# Patient Record
Sex: FEMALE | Race: WHITE | Employment: FULL TIME | URBAN - METROPOLITAN AREA
[De-identification: names, ages, dates, MRNs, and addresses within clinical notes are randomized per-mention and may not be internally consistent; named-entity substitution may affect disease eponyms.]

---

## 2017-07-10 ENCOUNTER — OFFICE VISIT (OUTPATIENT)
Dept: ORTHOPEDIC SURGERY | Age: 37
End: 2017-07-10

## 2017-07-10 VITALS
SYSTOLIC BLOOD PRESSURE: 109 MMHG | DIASTOLIC BLOOD PRESSURE: 74 MMHG | HEIGHT: 62 IN | HEART RATE: 76 BPM | BODY MASS INDEX: 28.52 KG/M2 | WEIGHT: 155 LBS

## 2017-07-10 DIAGNOSIS — M21.41 PES PLANUS OF BOTH FEET: ICD-10-CM

## 2017-07-10 DIAGNOSIS — M17.11 PRIMARY OSTEOARTHRITIS OF RIGHT KNEE: ICD-10-CM

## 2017-07-10 DIAGNOSIS — M23.303 MENISCUS, MEDIAL, DERANGEMENT, RIGHT: ICD-10-CM

## 2017-07-10 DIAGNOSIS — S83.411D SPRAIN OF MEDIAL COLLATERAL LIGAMENT OF RIGHT KNEE, SUBSEQUENT ENCOUNTER: ICD-10-CM

## 2017-07-10 DIAGNOSIS — M21.861 INTERNAL TIBIAL TORSION OF RIGHT LOWER EXTREMITY: ICD-10-CM

## 2017-07-10 DIAGNOSIS — T84.89XD ACL GRAFT TEAR, SUBSEQUENT ENCOUNTER: ICD-10-CM

## 2017-07-10 DIAGNOSIS — M25.461 EFFUSION OF RIGHT KNEE: ICD-10-CM

## 2017-07-10 DIAGNOSIS — Z98.890 S/P ACL REPAIR: Primary | ICD-10-CM

## 2017-07-10 DIAGNOSIS — M21.42 PES PLANUS OF BOTH FEET: ICD-10-CM

## 2017-07-10 PROCEDURE — 73564 X-RAY EXAM KNEE 4 OR MORE: CPT | Performed by: ORTHOPAEDIC SURGERY

## 2017-07-10 PROCEDURE — 99214 OFFICE O/P EST MOD 30 MIN: CPT | Performed by: ORTHOPAEDIC SURGERY

## 2017-07-10 RX ORDER — LIOTHYRONINE SODIUM 5 UG/1
TABLET ORAL
COMMUNITY
Start: 2017-04-05

## 2017-07-10 RX ORDER — RIZATRIPTAN BENZOATE 10 MG/1
10 TABLET ORAL
COMMUNITY

## 2017-07-17 ENCOUNTER — HOSPITAL ENCOUNTER (OUTPATIENT)
Dept: PHYSICAL THERAPY | Age: 37
Discharge: HOME | End: 2017-07-31
Admitting: ORTHOPAEDIC SURGERY

## 2017-07-31 ENCOUNTER — HOSPITAL ENCOUNTER (OUTPATIENT)
Dept: PHYSICAL THERAPY | Age: 37
Discharge: HOME | End: 2017-07-31
Admitting: ORTHOPAEDIC SURGERY

## 2018-10-04 ENCOUNTER — HOSPITAL ENCOUNTER (EMERGENCY)
Age: 38
Discharge: HOME | End: 2018-10-04
Attending: EMERGENCY MEDICINE
Payer: COMMERCIAL

## 2018-10-04 VITALS
DIASTOLIC BLOOD PRESSURE: 76 MMHG | RESPIRATION RATE: 16 BRPM | WEIGHT: 129.85 LBS | HEART RATE: 83 BPM | BODY MASS INDEX: 23.9 KG/M2 | OXYGEN SATURATION: 97 % | SYSTOLIC BLOOD PRESSURE: 125 MMHG | HEIGHT: 62 IN | TEMPERATURE: 98.3 F

## 2018-10-04 DIAGNOSIS — R51.9 FRONTAL HEADACHE: Primary | ICD-10-CM

## 2018-10-04 PROCEDURE — 2580000003 HC RX 258: Performed by: EMERGENCY MEDICINE

## 2018-10-04 PROCEDURE — 96361 HYDRATE IV INFUSION ADD-ON: CPT

## 2018-10-04 PROCEDURE — 6360000002 HC RX W HCPCS: Performed by: EMERGENCY MEDICINE

## 2018-10-04 PROCEDURE — 96375 TX/PRO/DX INJ NEW DRUG ADDON: CPT

## 2018-10-04 PROCEDURE — 96374 THER/PROPH/DIAG INJ IV PUSH: CPT

## 2018-10-04 PROCEDURE — 99283 EMERGENCY DEPT VISIT LOW MDM: CPT

## 2018-10-04 RX ORDER — 0.9 % SODIUM CHLORIDE 0.9 %
1000 INTRAVENOUS SOLUTION INTRAVENOUS ONCE
Status: COMPLETED | OUTPATIENT
Start: 2018-10-04 | End: 2018-10-04

## 2018-10-04 RX ORDER — KETOROLAC TROMETHAMINE 30 MG/ML
15 INJECTION, SOLUTION INTRAMUSCULAR; INTRAVENOUS ONCE
Status: COMPLETED | OUTPATIENT
Start: 2018-10-04 | End: 2018-10-04

## 2018-10-04 RX ORDER — DIPHENHYDRAMINE HYDROCHLORIDE 50 MG/ML
25 INJECTION INTRAMUSCULAR; INTRAVENOUS ONCE
Status: COMPLETED | OUTPATIENT
Start: 2018-10-04 | End: 2018-10-04

## 2018-10-04 RX ORDER — DEXAMETHASONE SODIUM PHOSPHATE 4 MG/ML
10 INJECTION, SOLUTION INTRA-ARTICULAR; INTRALESIONAL; INTRAMUSCULAR; INTRAVENOUS; SOFT TISSUE ONCE
Status: COMPLETED | OUTPATIENT
Start: 2018-10-04 | End: 2018-10-04

## 2018-10-04 RX ADMIN — PROCHLORPERAZINE EDISYLATE 10 MG: 5 INJECTION INTRAMUSCULAR; INTRAVENOUS at 17:21

## 2018-10-04 RX ADMIN — DIPHENHYDRAMINE HYDROCHLORIDE 25 MG: 50 INJECTION, SOLUTION INTRAMUSCULAR; INTRAVENOUS at 18:25

## 2018-10-04 RX ADMIN — SODIUM CHLORIDE 1000 ML: 9 INJECTION, SOLUTION INTRAVENOUS at 17:16

## 2018-10-04 RX ADMIN — DEXAMETHASONE SODIUM PHOSPHATE 10 MG: 4 INJECTION, SOLUTION INTRAMUSCULAR; INTRAVENOUS at 17:17

## 2018-10-04 RX ADMIN — KETOROLAC TROMETHAMINE 15 MG: 30 INJECTION, SOLUTION INTRAMUSCULAR at 17:19

## 2018-10-04 ASSESSMENT — PAIN DESCRIPTION - FREQUENCY: FREQUENCY: CONTINUOUS

## 2018-10-04 ASSESSMENT — PAIN DESCRIPTION - PAIN TYPE: TYPE: ACUTE PAIN

## 2018-10-04 ASSESSMENT — ENCOUNTER SYMPTOMS
VOMITING: 0
SHORTNESS OF BREATH: 0
TROUBLE SWALLOWING: 0
COLOR CHANGE: 0
PHOTOPHOBIA: 1
ABDOMINAL PAIN: 0
WHEEZING: 0
FACIAL SWELLING: 0
NAUSEA: 1
VOICE CHANGE: 0
STRIDOR: 0

## 2018-10-04 ASSESSMENT — PAIN SCALES - GENERAL
PAINLEVEL_OUTOF10: 1
PAINLEVEL_OUTOF10: 10
PAINLEVEL_OUTOF10: 10
PAINLEVEL_OUTOF10: 1

## 2018-10-04 ASSESSMENT — PAIN DESCRIPTION - ORIENTATION: ORIENTATION: LEFT

## 2018-10-04 ASSESSMENT — PAIN DESCRIPTION - DESCRIPTORS: DESCRIPTORS: ACHING;CONSTANT

## 2018-10-04 ASSESSMENT — PAIN DESCRIPTION - LOCATION: LOCATION: HEAD

## 2018-10-10 ENCOUNTER — APPOINTMENT (OUTPATIENT)
Dept: GENERAL RADIOLOGY | Age: 38
End: 2018-10-10
Payer: COMMERCIAL

## 2018-10-10 ENCOUNTER — HOSPITAL ENCOUNTER (EMERGENCY)
Age: 38
Discharge: HOME | End: 2018-10-10
Attending: EMERGENCY MEDICINE
Payer: COMMERCIAL

## 2018-10-10 VITALS
OXYGEN SATURATION: 98 % | SYSTOLIC BLOOD PRESSURE: 120 MMHG | HEART RATE: 80 BPM | TEMPERATURE: 98 F | BODY MASS INDEX: 23.85 KG/M2 | WEIGHT: 129.63 LBS | DIASTOLIC BLOOD PRESSURE: 72 MMHG | RESPIRATION RATE: 18 BRPM | HEIGHT: 62 IN

## 2018-10-10 DIAGNOSIS — N20.1 URETEROLITHIASIS: Primary | ICD-10-CM

## 2018-10-10 LAB
ANION GAP SERPL CALCULATED.3IONS-SCNC: 15 MMOL/L (ref 3–16)
BACTERIA: ABNORMAL /HPF
BASOPHILS ABSOLUTE: 0 K/UL (ref 0–0.2)
BASOPHILS RELATIVE PERCENT: 0.2 %
BILIRUBIN URINE: ABNORMAL
BLOOD, URINE: ABNORMAL
BUN BLDV-MCNC: 12 MG/DL (ref 7–20)
CALCIUM SERPL-MCNC: 9.2 MG/DL (ref 8.3–10.6)
CHLORIDE BLD-SCNC: 99 MMOL/L (ref 99–110)
CLARITY: ABNORMAL
CO2: 25 MMOL/L (ref 21–32)
COLOR: YELLOW
CREAT SERPL-MCNC: 1.2 MG/DL (ref 0.6–1.1)
EOSINOPHILS ABSOLUTE: 0.1 K/UL (ref 0–0.6)
EOSINOPHILS RELATIVE PERCENT: 1.1 %
EPITHELIAL CELLS, UA: 4 /HPF (ref 0–5)
GFR AFRICAN AMERICAN: >60
GFR NON-AFRICAN AMERICAN: 50
GLUCOSE BLD-MCNC: 92 MG/DL (ref 70–99)
GLUCOSE URINE: NEGATIVE MG/DL
HCT VFR BLD CALC: 39.5 % (ref 36–48)
HEMOGLOBIN: 13.7 G/DL (ref 12–16)
HYALINE CASTS: 6 /LPF (ref 0–8)
KETONES, URINE: 40 MG/DL
LEUKOCYTE ESTERASE, URINE: NEGATIVE
LYMPHOCYTES ABSOLUTE: 0.8 K/UL (ref 1–5.1)
LYMPHOCYTES RELATIVE PERCENT: 8.7 %
MCH RBC QN AUTO: 30.4 PG (ref 26–34)
MCHC RBC AUTO-ENTMCNC: 34.6 G/DL (ref 31–36)
MCV RBC AUTO: 88 FL (ref 80–100)
MICROSCOPIC EXAMINATION: YES
MONOCYTES ABSOLUTE: 0.6 K/UL (ref 0–1.3)
MONOCYTES RELATIVE PERCENT: 6.1 %
NEUTROPHILS ABSOLUTE: 7.5 K/UL (ref 1.7–7.7)
NEUTROPHILS RELATIVE PERCENT: 83.9 %
NITRITE, URINE: NEGATIVE
PDW BLD-RTO: 12.7 % (ref 12.4–15.4)
PH UA: 5.5
PLATELET # BLD: 214 K/UL (ref 135–450)
PMV BLD AUTO: 9.3 FL (ref 5–10.5)
POTASSIUM SERPL-SCNC: 3.9 MMOL/L (ref 3.5–5.1)
PROTEIN UA: ABNORMAL MG/DL
RBC # BLD: 4.49 M/UL (ref 4–5.2)
RBC UA: 32 /HPF (ref 0–4)
SODIUM BLD-SCNC: 139 MMOL/L (ref 136–145)
SPECIFIC GRAVITY UA: 1.02
URINE REFLEX TO CULTURE: YES
URINE TYPE: ABNORMAL
UROBILINOGEN, URINE: 0.2 E.U./DL
WBC # BLD: 9 K/UL (ref 4–11)
WBC UA: 8 /HPF (ref 0–5)

## 2018-10-10 PROCEDURE — 80048 BASIC METABOLIC PNL TOTAL CA: CPT

## 2018-10-10 PROCEDURE — 6360000002 HC RX W HCPCS: Performed by: EMERGENCY MEDICINE

## 2018-10-10 PROCEDURE — 85025 COMPLETE CBC W/AUTO DIFF WBC: CPT

## 2018-10-10 PROCEDURE — 99284 EMERGENCY DEPT VISIT MOD MDM: CPT

## 2018-10-10 PROCEDURE — 87086 URINE CULTURE/COLONY COUNT: CPT

## 2018-10-10 PROCEDURE — 74018 RADEX ABDOMEN 1 VIEW: CPT

## 2018-10-10 PROCEDURE — 96374 THER/PROPH/DIAG INJ IV PUSH: CPT

## 2018-10-10 PROCEDURE — 81001 URINALYSIS AUTO W/SCOPE: CPT

## 2018-10-10 PROCEDURE — 96375 TX/PRO/DX INJ NEW DRUG ADDON: CPT

## 2018-10-10 RX ORDER — ONDANSETRON 2 MG/ML
4 INJECTION INTRAMUSCULAR; INTRAVENOUS ONCE
Status: COMPLETED | OUTPATIENT
Start: 2018-10-10 | End: 2018-10-10

## 2018-10-10 RX ORDER — MORPHINE SULFATE 4 MG/ML
4 INJECTION, SOLUTION INTRAMUSCULAR; INTRAVENOUS ONCE
Status: COMPLETED | OUTPATIENT
Start: 2018-10-10 | End: 2018-10-10

## 2018-10-10 RX ORDER — KETOROLAC TROMETHAMINE 30 MG/ML
30 INJECTION, SOLUTION INTRAMUSCULAR; INTRAVENOUS ONCE
Status: COMPLETED | OUTPATIENT
Start: 2018-10-10 | End: 2018-10-10

## 2018-10-10 RX ADMIN — MORPHINE SULFATE 4 MG: 4 INJECTION INTRAVENOUS at 08:27

## 2018-10-10 RX ADMIN — ONDANSETRON 4 MG: 2 INJECTION INTRAMUSCULAR; INTRAVENOUS at 08:26

## 2018-10-10 RX ADMIN — KETOROLAC TROMETHAMINE 30 MG: 30 INJECTION, SOLUTION INTRAMUSCULAR at 08:26

## 2018-10-10 ASSESSMENT — PAIN DESCRIPTION - FREQUENCY: FREQUENCY: CONTINUOUS

## 2018-10-10 ASSESSMENT — PAIN SCALES - GENERAL
PAINLEVEL_OUTOF10: 3
PAINLEVEL_OUTOF10: 9
PAINLEVEL_OUTOF10: 9
PAINLEVEL_OUTOF10: 3
PAINLEVEL_OUTOF10: 9

## 2018-10-10 ASSESSMENT — PAIN - FUNCTIONAL ASSESSMENT: PAIN_FUNCTIONAL_ASSESSMENT: 0-10

## 2018-10-10 ASSESSMENT — PAIN DESCRIPTION - ORIENTATION
ORIENTATION: RIGHT

## 2018-10-10 ASSESSMENT — PAIN DESCRIPTION - LOCATION
LOCATION: FLANK

## 2018-10-10 ASSESSMENT — PAIN DESCRIPTION - PAIN TYPE
TYPE: ACUTE PAIN

## 2018-10-10 ASSESSMENT — PAIN DESCRIPTION - DESCRIPTORS: DESCRIPTORS: SHARP

## 2018-10-10 NOTE — ED PROVIDER NOTES
but is not limited to pyelonephritis, infected kidney stone, kidney stone with high-grade obstruction, appendicitis. There is no evidence of infection. She is not febrile. Her creatinine is only minimally elevated. She has follow-up arranged tomorrow at 1:45 PM with Dr. Jelena Garcia. The patient asked me to call to see if she could be seen any sooner. I spoke with Dr. Doretha Dill. They asked that she keep her scheduled appointment tomorrow, not eat or drink in the morning on the chance that they could possibly do something tomorrow afternoon as far as placing a stent. I discussed this with the patient. She will continue her current pain and nausea medicines and follow-up tomorrow. PROCEDURES:  None    FINAL IMPRESSION      1.  Ureterolithiasis          DISPOSITION/PLAN   DISPOSITION Decision To Discharge 10/10/2018 10:41:47 AM      PATIENT REFERRED TO:    Keep schedule appointment with Dr. Jelena Garcia tomorrow at 1:45 PM          DISCHARGE MEDICATIONS:  New Prescriptions    No medications on file       (Please note that portions of this note were completed with a voice recognition program.  Efforts were made to edit the dictations but occasionally words are mis-transcribed.)    Alejandra Zheng MD  Attending Emergency Physician        Aureliano Garner MD  10/10/18 [id number]

## 2018-10-11 LAB — URINE CULTURE, ROUTINE: NORMAL

## 2019-05-15 ENCOUNTER — HOSPITAL ENCOUNTER (OUTPATIENT)
Dept: GENERAL RADIOLOGY | Age: 39
Discharge: HOME | End: 2019-05-15
Payer: COMMERCIAL

## 2019-05-15 ENCOUNTER — HOSPITAL ENCOUNTER (OUTPATIENT)
Age: 39
Discharge: HOME | End: 2019-05-15
Payer: COMMERCIAL

## 2019-05-15 DIAGNOSIS — N20.1 CALCULUS OF URETER: ICD-10-CM

## 2019-05-15 PROCEDURE — 74018 RADEX ABDOMEN 1 VIEW: CPT

## 2021-04-12 PROCEDURE — 0001A COVID-19, PFIZER VACCINE 30MCG/0.3ML DOSE: CPT | Performed by: NURSE PRACTITIONER

## 2021-04-12 PROCEDURE — 91300 COVID-19, PFIZER VACCINE 30MCG/0.3ML DOSE: CPT | Performed by: NURSE PRACTITIONER

## 2021-04-13 ENCOUNTER — NURSE ONLY (OUTPATIENT)
Dept: PRIMARY CARE CLINIC | Age: 41
End: 2021-04-13
Payer: COMMERCIAL

## 2021-04-13 DIAGNOSIS — Z23 HIGH PRIORITY FOR COVID-19 VIRUS VACCINATION: Primary | ICD-10-CM

## 2021-05-03 PROCEDURE — 0002A COVID-19, PFIZER VACCINE 30MCG/0.3ML DOSE: CPT | Performed by: NURSE PRACTITIONER

## 2021-05-03 PROCEDURE — 91300 COVID-19, PFIZER VACCINE 30MCG/0.3ML DOSE: CPT | Performed by: NURSE PRACTITIONER

## 2021-05-04 ENCOUNTER — NURSE ONLY (OUTPATIENT)
Dept: PRIMARY CARE CLINIC | Age: 41
End: 2021-05-04
Payer: COMMERCIAL

## 2021-05-04 DIAGNOSIS — Z23 HIGH PRIORITY FOR COVID-19 VIRUS VACCINATION: Primary | ICD-10-CM

## 2022-10-26 LAB
ESTRADIOL, SENSITIVE: 133 PG/ML
FOLLICLE STIMULATING HORMONE: 6 MIU/ML
HCT VFR BLD CALC: 41.1 % (ref 35–45)
HEMOGLOBIN: 14.3 G/DL (ref 11.7–15.5)
MCH RBC QN AUTO: 31.3 PG (ref 27–33)
MCHC RBC AUTO-ENTMCNC: 34.8 G/DL (ref 32–36)
MCV RBC AUTO: 90 FL (ref 80–100)
PDW BLD-RTO: 12.4 % (ref 11–15)
PLATELET # BLD: 242 10E3/UL (ref 140–400)
PMV BLD AUTO: 8.4 FL (ref 7.5–11.5)
RBC # BLD: 4.57 10E6/UL (ref 3.8–5.1)
WBC # BLD: 5 10E3/UL (ref 3.8–10.8)

## 2022-11-02 LAB
TESTOSTERONE FREE PERCENT: 1.64 % (ref 0.5–2.8)
TESTOSTERONE FREE-MALE: 0.28 NG/DL (ref 0.1–0.85)
TESTOSTERONE TOTAL-MALE: 17 NG/DL (ref 4–50)